# Patient Record
Sex: MALE | Race: WHITE | NOT HISPANIC OR LATINO | Employment: FULL TIME | URBAN - METROPOLITAN AREA
[De-identification: names, ages, dates, MRNs, and addresses within clinical notes are randomized per-mention and may not be internally consistent; named-entity substitution may affect disease eponyms.]

---

## 2024-03-10 DIAGNOSIS — I10 PRIMARY HYPERTENSION: ICD-10-CM

## 2024-03-13 RX ORDER — ATORVASTATIN CALCIUM 10 MG/1
10 TABLET, FILM COATED ORAL NIGHTLY
Qty: 30 TABLET | Refills: 5 | Status: SHIPPED | OUTPATIENT
Start: 2024-03-13

## 2024-04-05 DIAGNOSIS — I10 PRIMARY HYPERTENSION: Primary | ICD-10-CM

## 2024-04-05 NOTE — TELEPHONE ENCOUNTER
Last visit:    heber   02/24/23  NO pending appt    Message left for patient to call this office, needs to have an office appt

## 2024-04-18 RX ORDER — VALSARTAN AND HYDROCHLOROTHIAZIDE 160; 12.5 MG/1; MG/1
1 TABLET, FILM COATED ORAL DAILY
Qty: 30 TABLET | Refills: 0 | Status: SHIPPED | OUTPATIENT
Start: 2024-04-18

## 2024-09-18 DIAGNOSIS — I10 PRIMARY HYPERTENSION: ICD-10-CM

## 2024-10-05 RX ORDER — ATORVASTATIN CALCIUM 10 MG/1
10 TABLET, FILM COATED ORAL NIGHTLY
Qty: 30 TABLET | Refills: 0 | OUTPATIENT
Start: 2024-10-05

## 2024-10-22 DIAGNOSIS — I10 PRIMARY HYPERTENSION: ICD-10-CM

## 2024-10-22 RX ORDER — ATORVASTATIN CALCIUM 10 MG/1
10 TABLET, FILM COATED ORAL NIGHTLY
Qty: 30 TABLET | Refills: 0 | OUTPATIENT
Start: 2024-10-22

## 2025-02-20 ENCOUNTER — OFFICE VISIT (OUTPATIENT)
Dept: URGENT CARE | Age: 65
End: 2025-02-20
Payer: COMMERCIAL

## 2025-02-20 ENCOUNTER — OFFICE VISIT (OUTPATIENT)
Dept: ORTHOPEDIC SURGERY | Facility: CLINIC | Age: 65
End: 2025-02-20
Payer: COMMERCIAL

## 2025-02-20 ENCOUNTER — ANCILLARY PROCEDURE (OUTPATIENT)
Dept: URGENT CARE | Age: 65
End: 2025-02-20
Payer: COMMERCIAL

## 2025-02-20 VITALS — BODY MASS INDEX: 29.78 KG/M2 | WEIGHT: 208 LBS | HEIGHT: 70 IN

## 2025-02-20 VITALS
RESPIRATION RATE: 16 BRPM | WEIGHT: 208 LBS | TEMPERATURE: 98.2 F | DIASTOLIC BLOOD PRESSURE: 89 MMHG | OXYGEN SATURATION: 95 % | HEART RATE: 85 BPM | SYSTOLIC BLOOD PRESSURE: 148 MMHG | BODY MASS INDEX: 29.84 KG/M2

## 2025-02-20 DIAGNOSIS — S97.101A CRUSH INJURY OF TOE, RIGHT, INITIAL ENCOUNTER: ICD-10-CM

## 2025-02-20 DIAGNOSIS — M79.671 RIGHT FOOT PAIN: Primary | ICD-10-CM

## 2025-02-20 DIAGNOSIS — S97.101A CRUSH INJURY OF TOE, RIGHT, INITIAL ENCOUNTER: Primary | ICD-10-CM

## 2025-02-20 DIAGNOSIS — S92.919B OPEN COMMINUTED FRACTURE OF PHALANX OF TOE: ICD-10-CM

## 2025-02-20 DIAGNOSIS — T14.8XXA CRUSH INJURY: ICD-10-CM

## 2025-02-20 PROCEDURE — 99213 OFFICE O/P EST LOW 20 MIN: CPT | Performed by: PHYSICIAN ASSISTANT

## 2025-02-20 RX ORDER — KETOROLAC TROMETHAMINE 30 MG/ML
60 INJECTION, SOLUTION INTRAMUSCULAR; INTRAVENOUS ONCE
Status: COMPLETED | OUTPATIENT
Start: 2025-02-20 | End: 2025-02-20

## 2025-02-20 RX ORDER — CEPHALEXIN 500 MG/1
500 CAPSULE ORAL 4 TIMES DAILY
Qty: 28 CAPSULE | Refills: 0 | Status: SHIPPED | OUTPATIENT
Start: 2025-02-20 | End: 2025-02-27

## 2025-02-20 RX ORDER — TRAMADOL HYDROCHLORIDE 50 MG/1
50 TABLET ORAL EVERY 6 HOURS PRN
Qty: 28 TABLET | Refills: 0 | Status: SHIPPED | OUTPATIENT
Start: 2025-02-20 | End: 2025-02-27

## 2025-02-20 RX ADMIN — KETOROLAC TROMETHAMINE 60 MG: 30 INJECTION, SOLUTION INTRAMUSCULAR; INTRAVENOUS at 09:41

## 2025-02-20 ASSESSMENT — PAIN - FUNCTIONAL ASSESSMENT: PAIN_FUNCTIONAL_ASSESSMENT: 0-10

## 2025-02-20 ASSESSMENT — PATIENT HEALTH QUESTIONNAIRE - PHQ9
1. LITTLE INTEREST OR PLEASURE IN DOING THINGS: NOT AT ALL
2. FEELING DOWN, DEPRESSED OR HOPELESS: NOT AT ALL
SUM OF ALL RESPONSES TO PHQ9 QUESTIONS 1 AND 2: 0

## 2025-02-20 ASSESSMENT — LIFESTYLE VARIABLES
HOW OFTEN DO YOU HAVE A DRINK CONTAINING ALCOHOL: NEVER
HOW OFTEN DO YOU HAVE SIX OR MORE DRINKS ON ONE OCCASION: NEVER

## 2025-02-20 ASSESSMENT — ENCOUNTER SYMPTOMS
ARTHRALGIAS: 1
LOSS OF SENSATION IN FEET: 0
DEPRESSION: 0
OCCASIONAL FEELINGS OF UNSTEADINESS: 0

## 2025-02-20 ASSESSMENT — PAIN SCALES - GENERAL
PAINLEVEL_OUTOF10: 7
PAINLEVEL_OUTOF10: 6

## 2025-02-20 ASSESSMENT — COLUMBIA-SUICIDE SEVERITY RATING SCALE - C-SSRS
2. HAVE YOU ACTUALLY HAD ANY THOUGHTS OF KILLING YOURSELF?: NO
6. HAVE YOU EVER DONE ANYTHING, STARTED TO DO ANYTHING, OR PREPARED TO DO ANYTHING TO END YOUR LIFE?: NO
1. IN THE PAST MONTH, HAVE YOU WISHED YOU WERE DEAD OR WISHED YOU COULD GO TO SLEEP AND NOT WAKE UP?: NO

## 2025-02-20 NOTE — PATIENT INSTRUCTIONS
Wear the splint until you follow up with Orthopedics  Tylenol/ibuprofen as needed for pain/discomfort

## 2025-02-20 NOTE — PATIENT INSTRUCTIONS
Thank you for coming to see us today!     Continue to use tylenol for pain control.   Rest, ice and elevate   Keep wound clean dry and covered  Wear the fracture shoe  Continue antibiotic    Follow up  in one week or sooner as needed

## 2025-02-20 NOTE — PROGRESS NOTES
Subjective      Chief Complaint   Patient presents with    Right Foot - Pain     Right great and second toe        No surgery found     HPI  This 64 year old patient presents for evaluation right great and second toe pain. The patient presents with a chief complaint of crush injury to his right great and second toe while at work  early this monring.  Patient states there was a 360 pound cylinder that rolled off a pallet coming down on the foot.  He is having difficulty with weightbearing and ambulation related to severe pain. He was evaluated at Urgent Care and a Right foot xray was obtained; comminuted fracture of the phalanx of toe.  His wound was cleansed and dressed with 4x4 and secured with tape.  Post op shoe was given. He was given Toradol and started on antibiotics  .   He currently rates right foot pain at 7, walking in the fracture shoe is comfortable.     CARDIOLOGY:   Negative for chest pain, shortness of breath.   RESPIRATORY:   Negative for chest pain, shortness of breath.   MUSCULOSKELETAL:   See HPI for details.   NEUROLOGY:   Negative for tingling, numbness, weakness.    Objective    There were no vitals filed for this visit.    Physical Exam  GENERAL:          General Appearance:  This is a pleasant patient with appropriate affect, in no acute distress.   DERMATOLOGY:          Skin: skin at the neck, upper and lower back, and trunk is intact. There is no evidence of skin rash, skin breakdown or ulceration, or atrophic skin change.   EXTREMITIES:          Vascular:  Right, left hands and feet are warm with good color and pulses. Right and left calf and thigh are nontender and nonswollen.   NEUROLOGICAL:          Orientation:  Patient is alert and oriented to person, place, time and situation. Right and left upper and lower extremity motor and sensory examinations are intact.      MUSCULOSKELETAL: Neck: Nontender. No pain with range of motion. Right foot: There post op shoe is removed. There is  active bleeding from the right great toe nail bed. There is injury to the nail bed. The skin is intact with no open wound. Decreased sensation to light touch at the right great toe. Capillary refill slightly decreased, however the right great toe is perfusing with good coloring with soft compartments and distal pulses intact.     XR foot right 3+ views    Result Date: 2/20/2025  Interpreted By:  Kalee Laws, STUDY: XR FOOT RIGHT 3+ VIEWS;  2/20/2025 9:39 am   INDICATION: Signs/Symptoms:right foot pain.   ,S97.101A Crushing injury of unspecified right toe(s), initial encounter   COMPARISON: None.   ACCESSION NUMBER(S): [id number]   ORDERING CLINICIAN: CRYSTAL SEVERINO   FINDINGS: Three views of the right foot obtained.   Comminuted ill-defined mildly distracted fracture of the tuft of the 1st distal phalanx. Questionable soft tissue swelling in the 5th digit with irregularity and cortical thickening of the 5th proximal phalanx. 1st MTP joint space narrowing and spurring. Spurring and cystic changes in the midfoot. Spurring along the posterior and inferior margins of the calcaneus.       Comminuted mildly distracted fracture of the tuft of the 1st distal phalanx.   Questionable irregularity of the 5th proximal phalanx with soft tissue swelling. Correlation with site of injury and overlying point tenderness recommended.   Multifocal productive/degenerative changes.   MACRO: None.   Signed by: Kalee Laws 2/20/2025 9:46 AM Dictation workstation:   [id number]       Arie was seen today for pain.  Diagnoses and all orders for this visit:  Right foot pain (Primary)  Open comminuted fracture of phalanx of toe  -     Referral to Orthopaedic Surgery  -     traMADol (Ultram) 50 mg tablet; Take 1 tablet (50 mg) by mouth every 6 hours if needed for severe pain (7 - 10) for up to 7 days.  Crush injury     Options are discussed with the patient in detail. The patient great toe is cleansed thoroughly and dressed. The patient  is instructed regarding infection precautions, and to dressing change as the dressing soils, continue antibiotics. The patient is instructed regarding activity modification and risk for further injury with falling or trauma, ice, provider directed at home gentle strengthening and ROM exercises, and the appropriate use of Tylenol/tramadol as needed for pain with its potential adverse reactions and side effects. The patient understands. I estimate that this patient is able to return to work on Monday and he is given a note regarding this.  He is instructed to regarding wearing the fracture shoe and the risk of further trauma with a second injury and he understands.  I instruct the patient to call us immediately if his symptoms worsen or he has an increase in pain.  Follow up in 1 weeks or sooner as needed. Please note that this report has been produced using speech recognition software.  It may contain errors related to grammar, punctuation or spelling.  Electronically signed, but not reviewed.   Areli Alfaro PA-C

## 2025-02-20 NOTE — LETTER
February 20, 2025     Patient: Arie Ugarte   YOB: 1960   Date of Visit: 2/20/2025       To Whom It May Concern:    It is my medical opinion that Arie Ugarte may return to work on 2- .    If you have any questions or concerns, please don't hesitate to call.         Sincerely,        Areli Alfaro PA-C    CC: No Recipients

## 2025-02-20 NOTE — PROGRESS NOTES
Subjective   Patient ID: Arie Ugarte is a 64 y.o. male. They present today with a chief complaint of right toe pain (Smashed toes several minutes ago).    History of Present Illness    History provided by:  Patient  Injury  Location:  Right great toe and 2nd toe  Quality:  Aching, throbbing  Severity:  Moderate  Onset quality:  Sudden  Duration:  30 minutes  Timing:  Constant  Progression:  Worsening  Chronicity:  New  Context:  360 lb  Relieved by:  Nothing  Worsened by:  Movement, touch  Ineffective treatments:  None tried      Past Medical History  Allergies as of 02/20/2025 - Reviewed 02/20/2025   Allergen Reaction Noted    Sulfa (sulfonamide antibiotics) Unknown 02/20/2025       (Not in a hospital admission)       No past medical history on file.    No past surgical history on file.         Review of Systems  Review of Systems   Musculoskeletal:  Positive for arthralgias.        Crush injury to right great and 2nd toe   Great toe bleeding at the nail bed   All other systems reviewed and are negative.                                 Objective    Vitals:    02/20/25 0910   BP: 148/89   BP Location: Left arm   Patient Position: Sitting   BP Cuff Size: Large adult   Pulse: 85   Resp: 16   Temp: 36.8 °C (98.2 °F)   TempSrc: Temporal   SpO2: 95%   Weight: 94.3 kg (208 lb)     No LMP for male patient.    Physical Exam  Vitals and nursing note reviewed.   Musculoskeletal:      Right foot: Decreased range of motion and decreased capillary refill. Swelling, tenderness and bony tenderness present. Normal pulse.        Feet:          Procedures    Point of Care Test & Imaging Results from this visit  No results found for this visit on 02/20/25.   No results found.    Diagnostic study results (if any) were reviewed by Crystal L Severino, APRN-CNP.    Assessment/Plan   Allergies, medications, history, and pertinent labs/EKGs/Imaging reviewed by Crystal L Severino, APRN-CNP.     Medical Decision Making  64-year-old male  patient presents with chief complaint of crush injury to his right great and second toe while at work approximately 30 to 45 minutes PTA.  Patient states there was a 360 pound cylinder that rolled off a pallet coming down on the foot.  He is having difficulty with weightbearing and ambulation related to severe pain.  Musc exam as above.  Right foot xray is obtained; comminuted fracture of the phalanx of toe.  Wound is cleansed and dressed with 4x4 and secured with tape.  Post op shoe is given.  Patient is to follow up with Occupational Health and Orthopedics.  At time of discharge patient was clinically well-appearing and HDS for outpatient management. The patient and/or family was educated regarding diagnosis, supportive care, OTC and Rx medications. The patient and/or family was given the opportunity to ask questions prior to discharge.  They verbalized understanding of my discussion of the plans for treatment, expected course, indications to return to  or seek further evaluation in ED, and the need for timely follow up as directed.   They were provided with a work/school excuse if requested.        Orders and Diagnoses  Diagnoses and all orders for this visit:  Crush injury of toe, right, initial encounter  -     XR foot right 3+ views; Future  -     ketorolac (Toradol) injection 60 mg  Wear the splint until you follow up with Orthopedics  Tylenol/ibuprofen as needed for pain/discomfort  RICE    Medical Admin Record      Patient disposition: Home    Electronically signed by Crystal L Severino, APRN-CNP  9:28 AM

## 2025-02-25 NOTE — PROGRESS NOTES
Subjective      Chief Complaint   Patient presents with    Right Foot - Pain        No surgery found     HPI  This 64 year old patient presents for reevaluation of right great toe crush injury and phalanx fracture. . The patient presents with a chief complaint of crush injury to his right great and second toe while at work on 2/20/25.   Patient states there was a 360 pound cylinder that rolled off a pallet coming down on the foot.  He states that the right toe pain is improving. He continues antibiotics. He was evaluated at Urgent Care and a Right foot xray was obtained; comminuted fracture of the phalanx of toe.  His wound was cleansed and dressed with 4x4 and secured with tape.  He returned to work on 2/25/25 and is doing well. He rates pain currently at 2-3/10.       CARDIOLOGY:   Negative for chest pain, shortness of breath.   RESPIRATORY:   Negative for chest pain, shortness of breath.   MUSCULOSKELETAL:   See HPI for details.   NEUROLOGY:   Negative for tingling, numbness, weakness.    Objective    There were no vitals filed for this visit.    Physical Exam  GENERAL:          General Appearance:  This is a pleasant patient with appropriate affect, in no acute distress.   DERMATOLOGY:          Skin: skin at the neck, upper and lower back, and trunk is intact. There is no evidence of skin rash, skin breakdown or ulceration, or atrophic skin change.   EXTREMITIES:          Vascular:  Right, left hands and feet are warm with good color and pulses. Right and left calf and thigh are nontender and nonswollen.   NEUROLOGICAL:          Orientation:  Patient is alert and oriented to person, place, time and situation. Right and left upper and lower extremity motor and sensory examinations are intact.      MUSCULOSKELETAL: Neck: Nontender. No pain with range of motion. Right foot: There shoe is removed. There is no active bleeding. Skin is clean and dry with no evidence of infection. There is injury to the nail bed. The  skin is intact with no open wound. Decreased sensation to light touch at the right great toe. Capillary refill improving, the right great toe is perfusing with good coloring with soft compartments and distal pulses intact.     XR foot right 3+ views    Result Date: 2/20/2025  Interpreted By:  Kalee Laws, STUDY: XR FOOT RIGHT 3+ VIEWS;  2/20/2025 9:39 am   INDICATION: Signs/Symptoms:right foot pain.   ,S97.101A Crushing injury of unspecified right toe(s), initial encounter   COMPARISON: None.   ACCESSION NUMBER(S): [id number]   ORDERING CLINICIAN: CRYSTAL SEVERINO   FINDINGS: Three views of the right foot obtained.   Comminuted ill-defined mildly distracted fracture of the tuft of the 1st distal phalanx. Questionable soft tissue swelling in the 5th digit with irregularity and cortical thickening of the 5th proximal phalanx. 1st MTP joint space narrowing and spurring. Spurring and cystic changes in the midfoot. Spurring along the posterior and inferior margins of the calcaneus.       Comminuted mildly distracted fracture of the tuft of the 1st distal phalanx.   Questionable irregularity of the 5th proximal phalanx with soft tissue swelling. Correlation with site of injury and overlying point tenderness recommended.   Multifocal productive/degenerative changes.   MACRO: None.   Signed by: Kalee Lasw 2/20/2025 9:46 AM Dictation workstation:   [id number]       Arie was seen today for pain.  Diagnoses and all orders for this visit:  Right foot pain (Primary)  Open comminuted fracture of phalanx of toe  Crush injury    Options are discussed with the patient in detail. The patient is instructed regarding infection precautions, continue antibiotics. The patient is instructed regarding activity modification and risk for further injury with falling or trauma, ice, provider directed at home gentle strengthening and ROM exercises, and the appropriate use of Tylenol/tramadol as needed for pain with its potential adverse  reactions and side effects. The patient understands. He is instructed to regarding wearing a supportive shoe for support.  I instruct the patient to call us immediately if his symptoms worsen or he has an increase in pain.  Follow up in 4 weeks or sooner as needed. Please note that this report has been produced using speech recognition software.  It may contain errors related to grammar, punctuation or spelling.  Electronically signed, but not reviewed.     Areli Alfaro PA-C

## 2025-02-27 ENCOUNTER — OFFICE VISIT (OUTPATIENT)
Dept: ORTHOPEDIC SURGERY | Facility: CLINIC | Age: 65
End: 2025-02-27
Payer: COMMERCIAL

## 2025-02-27 VITALS — WEIGHT: 208 LBS | BODY MASS INDEX: 29.78 KG/M2 | HEIGHT: 70 IN

## 2025-02-27 DIAGNOSIS — T14.8XXA CRUSH INJURY: ICD-10-CM

## 2025-02-27 DIAGNOSIS — S92.919B OPEN COMMINUTED FRACTURE OF PHALANX OF TOE: ICD-10-CM

## 2025-02-27 DIAGNOSIS — M79.671 RIGHT FOOT PAIN: Primary | ICD-10-CM

## 2025-02-27 PROCEDURE — 99213 OFFICE O/P EST LOW 20 MIN: CPT | Performed by: PHYSICIAN ASSISTANT

## 2025-02-27 ASSESSMENT — LIFESTYLE VARIABLES
HOW OFTEN DURING THE LAST YEAR HAVE YOU HAD A FEELING OF GUILT OR REMORSE AFTER DRINKING: NEVER
HOW OFTEN DO YOU HAVE SIX OR MORE DRINKS ON ONE OCCASION: NEVER
AUDIT-C TOTAL SCORE: 0
HAS A RELATIVE, FRIEND, DOCTOR, OR ANOTHER HEALTH PROFESSIONAL EXPRESSED CONCERN ABOUT YOUR DRINKING OR SUGGESTED YOU CUT DOWN: NO
HOW OFTEN DO YOU HAVE A DRINK CONTAINING ALCOHOL: NEVER
HOW OFTEN DURING THE LAST YEAR HAVE YOU NEEDED AN ALCOHOLIC DRINK FIRST THING IN THE MORNING TO GET YOURSELF GOING AFTER A NIGHT OF HEAVY DRINKING: NEVER
AUDIT TOTAL SCORE: 0
SKIP TO QUESTIONS 9-10: 1
HAVE YOU OR SOMEONE ELSE BEEN INJURED AS A RESULT OF YOUR DRINKING: NO
HOW MANY STANDARD DRINKS CONTAINING ALCOHOL DO YOU HAVE ON A TYPICAL DAY: PATIENT DOES NOT DRINK
HOW OFTEN DURING THE LAST YEAR HAVE YOU FAILED TO DO WHAT WAS NORMALLY EXPECTED FROM YOU BECAUSE OF DRINKING: NEVER
HOW OFTEN DURING THE LAST YEAR HAVE YOU BEEN UNABLE TO REMEMBER WHAT HAPPENED THE NIGHT BEFORE BECAUSE YOU HAD BEEN DRINKING: NEVER
HOW OFTEN DURING THE LAST YEAR HAVE YOU FOUND THAT YOU WERE NOT ABLE TO STOP DRINKING ONCE YOU HAD STARTED: NEVER

## 2025-02-27 ASSESSMENT — PAIN - FUNCTIONAL ASSESSMENT: PAIN_FUNCTIONAL_ASSESSMENT: 0-10

## 2025-02-27 ASSESSMENT — COLUMBIA-SUICIDE SEVERITY RATING SCALE - C-SSRS
6. HAVE YOU EVER DONE ANYTHING, STARTED TO DO ANYTHING, OR PREPARED TO DO ANYTHING TO END YOUR LIFE?: NO
1. IN THE PAST MONTH, HAVE YOU WISHED YOU WERE DEAD OR WISHED YOU COULD GO TO SLEEP AND NOT WAKE UP?: NO
2. HAVE YOU ACTUALLY HAD ANY THOUGHTS OF KILLING YOURSELF?: NO

## 2025-02-27 ASSESSMENT — PAIN SCALES - GENERAL
PAINLEVEL_OUTOF10: 3
PAINLEVEL_OUTOF10: 2

## 2025-02-27 ASSESSMENT — ENCOUNTER SYMPTOMS
LOSS OF SENSATION IN FEET: 0
DEPRESSION: 0
OCCASIONAL FEELINGS OF UNSTEADINESS: 0

## 2025-02-27 ASSESSMENT — PAIN DESCRIPTION - DESCRIPTORS: DESCRIPTORS: ACHING

## 2025-02-27 NOTE — PATIENT INSTRUCTIONS
Thank you for coming to see us today!     Continue to use tylenol for pain control.   Rest, ice and elevate   Keep wound clean dry and covered  Wear comfortable shoes  Continue antibiotic  Epson salt baths    Follow up  in 4 weeks  or sooner as needed

## 2025-03-24 NOTE — PROGRESS NOTES
Subjective      Chief Complaint   Patient presents with    Right Foot - Pain, Follow-up        No surgery found     HPI  This 64 year old patient presents for reevaluation of right great toe crush injury and phalanx fracture. The patient presents with a chief complaint of crush injury to his right great and second toe while at work on 2/20/25.   Patient states there was a 360 pound cylinder that rolled off a pallet coming down on the foot.  He states that the right toe pain is improving. He continues antibiotics. He was evaluated at Urgent Care and a Right foot xray was obtained; comminuted fracture of the phalanx of toe.  His wound was cleansed and dressed with 4x4 and secured with tape.  He returned to work on 2/25/25 and is doing well. He rates pain currently at 2/10. He states that his toe nail fell off today.       CARDIOLOGY:   Negative for chest pain, shortness of breath.   RESPIRATORY:   Negative for chest pain, shortness of breath.   MUSCULOSKELETAL:   See HPI for details.   NEUROLOGY:   Negative for tingling, numbness, weakness.    Objective    There were no vitals filed for this visit.    Physical Exam  GENERAL:          General Appearance:  This is a pleasant patient with appropriate affect, in no acute distress.   DERMATOLOGY:          Skin: skin at the neck, upper and lower back, and trunk is intact. There is no evidence of skin rash, skin breakdown or ulceration, or atrophic skin change.   EXTREMITIES:          Vascular:  Right, left hands and feet are warm with good color and pulses. Right and left calf and thigh are nontender and nonswollen.   NEUROLOGICAL:          Orientation:  Patient is alert and oriented to person, place, time and situation. Right and left upper and lower extremity motor and sensory examinations are intact.      MUSCULOSKELETAL: Neck: Nontender. No pain with range of motion. Right foot: There shoe is removed. There is no active bleeding. Skin is clean and dry with no evidence  "of infection. A new nail is growing. The skin is intact with no open wound. Sensation intact to light touch at the right great toe. Capillary refill improving, the right great toe is perfusing with good coloring with soft compartments and distal pulses intact.     XR foot right 3+ views    Result Date: 2/20/2025  Interpreted By:  Kalee Laws, STUDY: XR FOOT RIGHT 3+ VIEWS;  2/20/2025 9:39 am   INDICATION: Signs/Symptoms:right foot pain.   ,S97.101A Crushing injury of unspecified right toe(s), initial encounter   COMPARISON: None.   ACCESSION NUMBER(S): [id number]   ORDERING CLINICIAN: CRYSTAL SEVERINO   FINDINGS: Three views of the right foot obtained.   Comminuted ill-defined mildly distracted fracture of the tuft of the 1st distal phalanx. Questionable soft tissue swelling in the 5th digit with irregularity and cortical thickening of the 5th proximal phalanx. 1st MTP joint space narrowing and spurring. Spurring and cystic changes in the midfoot. Spurring along the posterior and inferior margins of the calcaneus.       Comminuted mildly distracted fracture of the tuft of the 1st distal phalanx.   Questionable irregularity of the 5th proximal phalanx with soft tissue swelling. Correlation with site of injury and overlying point tenderness recommended.   Multifocal productive/degenerative changes.   MACRO: None.   Signed by: Kalee Laws 2/20/2025 9:46 AM Dictation workstation:   [id number]       Arie \"Jared\" was seen today for pain and follow-up.  Diagnoses and all orders for this visit:  Right foot pain (Primary)  -     XR foot right 1-2 views; Future  Open comminuted fracture of phalanx of toe  -     XR foot right 1-2 views; Future  Crush injury  -     XR foot right 1-2 views; Future      Options are discussed with the patient in detail. The patient is instructed regarding activity modification and risk for further injury with falling or trauma, ice, provider directed at home gentle strengthening and ROM " exercises, and the appropriate use of Tylenol as needed for pain with its potential adverse reactions and side effects. The patient understands. He is instructed to regarding wearing a supportive shoe for support.  Follow up as needed. Please note that this report has been produced using speech recognition software.  It may contain errors related to grammar, punctuation or spelling.  Electronically signed, but not reviewed.     Areli Alfaro PA-C

## 2025-03-27 ENCOUNTER — HOSPITAL ENCOUNTER (OUTPATIENT)
Dept: RADIOLOGY | Facility: CLINIC | Age: 65
Discharge: HOME | End: 2025-03-27
Payer: COMMERCIAL

## 2025-03-27 ENCOUNTER — OFFICE VISIT (OUTPATIENT)
Dept: ORTHOPEDIC SURGERY | Facility: CLINIC | Age: 65
End: 2025-03-27
Payer: COMMERCIAL

## 2025-03-27 VITALS — BODY MASS INDEX: 29.78 KG/M2 | WEIGHT: 208 LBS | HEIGHT: 70 IN

## 2025-03-27 DIAGNOSIS — T14.8XXA CRUSH INJURY: ICD-10-CM

## 2025-03-27 DIAGNOSIS — M79.671 RIGHT FOOT PAIN: ICD-10-CM

## 2025-03-27 DIAGNOSIS — M79.671 RIGHT FOOT PAIN: Primary | ICD-10-CM

## 2025-03-27 DIAGNOSIS — S92.919B OPEN COMMINUTED FRACTURE OF PHALANX OF TOE: ICD-10-CM

## 2025-03-27 PROCEDURE — 99213 OFFICE O/P EST LOW 20 MIN: CPT | Performed by: PHYSICIAN ASSISTANT

## 2025-03-27 PROCEDURE — 73620 X-RAY EXAM OF FOOT: CPT | Mod: RT

## 2025-03-27 ASSESSMENT — PAIN SCALES - GENERAL
PAINLEVEL_OUTOF10: 2
PAINLEVEL_OUTOF10: 2

## 2025-03-27 ASSESSMENT — PAIN - FUNCTIONAL ASSESSMENT: PAIN_FUNCTIONAL_ASSESSMENT: 0-10

## 2025-03-27 ASSESSMENT — LIFESTYLE VARIABLES
HOW OFTEN DO YOU HAVE A DRINK CONTAINING ALCOHOL: NEVER
HOW OFTEN DURING THE LAST YEAR HAVE YOU BEEN UNABLE TO REMEMBER WHAT HAPPENED THE NIGHT BEFORE BECAUSE YOU HAD BEEN DRINKING: NEVER
AUDIT-C TOTAL SCORE: 0
HOW MANY STANDARD DRINKS CONTAINING ALCOHOL DO YOU HAVE ON A TYPICAL DAY: PATIENT DOES NOT DRINK
HOW OFTEN DURING THE LAST YEAR HAVE YOU FAILED TO DO WHAT WAS NORMALLY EXPECTED FROM YOU BECAUSE OF DRINKING: NEVER
HOW OFTEN DURING THE LAST YEAR HAVE YOU FOUND THAT YOU WERE NOT ABLE TO STOP DRINKING ONCE YOU HAD STARTED: NEVER
HOW OFTEN DURING THE LAST YEAR HAVE YOU HAD A FEELING OF GUILT OR REMORSE AFTER DRINKING: NEVER
HOW OFTEN DO YOU HAVE SIX OR MORE DRINKS ON ONE OCCASION: NEVER
SKIP TO QUESTIONS 9-10: 1
HAVE YOU OR SOMEONE ELSE BEEN INJURED AS A RESULT OF YOUR DRINKING: NO
HAS A RELATIVE, FRIEND, DOCTOR, OR ANOTHER HEALTH PROFESSIONAL EXPRESSED CONCERN ABOUT YOUR DRINKING OR SUGGESTED YOU CUT DOWN: NO
HOW OFTEN DURING THE LAST YEAR HAVE YOU NEEDED AN ALCOHOLIC DRINK FIRST THING IN THE MORNING TO GET YOURSELF GOING AFTER A NIGHT OF HEAVY DRINKING: NEVER
AUDIT TOTAL SCORE: 0

## 2025-03-27 ASSESSMENT — ENCOUNTER SYMPTOMS
DEPRESSION: 0
OCCASIONAL FEELINGS OF UNSTEADINESS: 0
LOSS OF SENSATION IN FEET: 0

## 2025-03-27 ASSESSMENT — PAIN DESCRIPTION - DESCRIPTORS: DESCRIPTORS: SORE;TENDER

## 2025-04-08 ENCOUNTER — APPOINTMENT (OUTPATIENT)
Dept: RADIOLOGY | Facility: CLINIC | Age: 65
End: 2025-04-08